# Patient Record
Sex: FEMALE | Race: BLACK OR AFRICAN AMERICAN | NOT HISPANIC OR LATINO | Employment: FULL TIME | URBAN - METROPOLITAN AREA
[De-identification: names, ages, dates, MRNs, and addresses within clinical notes are randomized per-mention and may not be internally consistent; named-entity substitution may affect disease eponyms.]

---

## 2018-01-09 ENCOUNTER — TELEPHONE (OUTPATIENT)
Dept: UROGYNECOLOGY | Facility: CLINIC | Age: 23
End: 2018-01-09

## 2018-01-09 NOTE — TELEPHONE ENCOUNTER
Left voice message for pt to give the office a call back at [phone] to reschedule appointment.

## 2018-01-10 ENCOUNTER — TELEPHONE (OUTPATIENT)
Dept: UROGYNECOLOGY | Facility: CLINIC | Age: 23
End: 2018-01-10

## 2020-04-15 ENCOUNTER — OFFICE VISIT (OUTPATIENT)
Dept: OBSTETRICS AND GYNECOLOGY | Facility: CLINIC | Age: 25
End: 2020-04-15
Payer: COMMERCIAL

## 2020-04-15 VITALS
BODY MASS INDEX: 29.76 KG/M2 | WEIGHT: 185.19 LBS | DIASTOLIC BLOOD PRESSURE: 70 MMHG | HEIGHT: 66 IN | SYSTOLIC BLOOD PRESSURE: 120 MMHG | TEMPERATURE: 99 F

## 2020-04-15 DIAGNOSIS — Z32.01 POSITIVE PREGNANCY TEST: ICD-10-CM

## 2020-04-15 DIAGNOSIS — N91.2 AMENORRHEA: Primary | ICD-10-CM

## 2020-04-15 LAB
B-HCG UR QL: POSITIVE
CTP QC/QA: YES

## 2020-04-15 PROCEDURE — 3008F PR BODY MASS INDEX (BMI) DOCUMENTED: ICD-10-PCS | Mod: CPTII,S$GLB,, | Performed by: OBSTETRICS & GYNECOLOGY

## 2020-04-15 PROCEDURE — 87086 URINE CULTURE/COLONY COUNT: CPT

## 2020-04-15 PROCEDURE — 3008F BODY MASS INDEX DOCD: CPT | Mod: CPTII,S$GLB,, | Performed by: OBSTETRICS & GYNECOLOGY

## 2020-04-15 PROCEDURE — 99202 PR OFFICE/OUTPT VISIT, NEW, LEVL II, 15-29 MIN: ICD-10-PCS | Mod: 25,S$GLB,, | Performed by: OBSTETRICS & GYNECOLOGY

## 2020-04-15 PROCEDURE — 99999 PR PBB SHADOW E&M-EST. PATIENT-LVL III: CPT | Mod: PBBFAC,,, | Performed by: OBSTETRICS & GYNECOLOGY

## 2020-04-15 PROCEDURE — 88175 CYTOPATH C/V AUTO FLUID REDO: CPT

## 2020-04-15 PROCEDURE — 81025 URINE PREGNANCY TEST: CPT | Mod: S$GLB,,, | Performed by: OBSTETRICS & GYNECOLOGY

## 2020-04-15 PROCEDURE — 99999 PR PBB SHADOW E&M-EST. PATIENT-LVL III: ICD-10-PCS | Mod: PBBFAC,,, | Performed by: OBSTETRICS & GYNECOLOGY

## 2020-04-15 PROCEDURE — 81025 POCT URINE PREGNANCY: ICD-10-PCS | Mod: S$GLB,,, | Performed by: OBSTETRICS & GYNECOLOGY

## 2020-04-15 PROCEDURE — 99202 OFFICE O/P NEW SF 15 MIN: CPT | Mod: 25,S$GLB,, | Performed by: OBSTETRICS & GYNECOLOGY

## 2020-04-15 PROCEDURE — 87491 CHLMYD TRACH DNA AMP PROBE: CPT

## 2020-04-15 NOTE — PROGRESS NOTES
SUBJECTIVE:   24 y.o. female   for annual routine Pap and checkup. No LMP recorded. Patient is pregnant..  She complains of amenorrhea.  +UPT.        History reviewed. No pertinent past medical history.  History reviewed. No pertinent surgical history.  Social History     Socioeconomic History    Marital status: Single     Spouse name: Not on file    Number of children: Not on file    Years of education: Not on file    Highest education level: Not on file   Occupational History    Not on file   Social Needs    Financial resource strain: Not on file    Food insecurity:     Worry: Not on file     Inability: Not on file    Transportation needs:     Medical: Not on file     Non-medical: Not on file   Tobacco Use    Smoking status: Former Smoker    Smokeless tobacco: Never Used   Substance and Sexual Activity    Alcohol use: Not Currently     Comment: occasional    Drug use: Never     Comment: occasional    Sexual activity: Yes     Partners: Male   Lifestyle    Physical activity:     Days per week: Not on file     Minutes per session: Not on file    Stress: Not on file   Relationships    Social connections:     Talks on phone: Not on file     Gets together: Not on file     Attends Christian service: Not on file     Active member of club or organization: Not on file     Attends meetings of clubs or organizations: Not on file     Relationship status: Not on file   Other Topics Concern    Not on file   Social History Narrative    Not on file     Family History   Problem Relation Age of Onset    Breast cancer Neg Hx     Colon cancer Neg Hx     Ovarian cancer Neg Hx      OB History    Para Term  AB Living   1 0 0 0 0 0   SAB TAB Ectopic Multiple Live Births   0 0 0 0 0      # Outcome Date GA Lbr Harry/2nd Weight Sex Delivery Anes PTL Lv   1 Current                  Current Outpatient Medications   Medication Sig Dispense Refill    prenatal vit,calc76/iron/folic (PNV 29-1 ORAL) Take 1  "tablet by mouth once daily.      multivit41-iron-folate8-ps-dha (ENBRACE HR) 1.5 mg iron- 8.73 mg-6.4 mg CpID Take 1 capsule by mouth once daily. 30 each 12     No current facility-administered medications for this visit.      Allergies: Patient has no known allergies.     ROS:  Constitutional: no weight loss, weight gain, fever, fatigue  Eyes:  No vision changes, glasses/contacts  ENT/Mouth: No ulcers, sinus problems, ears ringing, headache  Cardiovascular: No inability to lie flat, chest pain, exercise intolerance, swelling, heart palpitations  Respiratory: No wheezing, coughing blood, shortness of breath, or cough  Gastrointestinal: No diarrhea, bloody stool, nausea/vomiting, constipation, gas, hemorrhoids  Genitourinary: No blood in urine, painful urination, urgency of urination, frequency of urination, incomplete emptying, incontinence, abnormal bleeding, painful periods, heavy periods, vaginal discharge, vaginal odor, painful intercourse, sexual problems, bleeding after intercourse.  Musculoskeletal: No muscle weakness  Skin/Breast: No painful breasts, nipple discharge, masses, rash, ulcers  Neurological: No passing out, seizures, numbness, headache  Endocrine: No diabetes, hypothyroid, hyperthyroid, hot flashes, hair loss, abnormal hair growth, ance  Psychiatric: No depression, crying  Hematologic: No bruises, bleeding, swollen lymph nodes, anemia.      OBJECTIVE:   The patient appears well, alert, oriented x 3, in no distress.  /70   Temp 98.6 °F (37 °C)   Ht 5' 6" (1.676 m)   Wt 84 kg (185 lb 3 oz)   BMI 29.89 kg/m²   NECK: no thyromegaly, trachea midline  SKIN: no acne, striae, hirsutism  BREAST EXAM: breasts appear normal, no suspicious masses, no skin or nipple changes or axillary nodes  ABDOMEN: no hernias, masses, or hepatosplenomegaly  GENITALIA: normal external genitalia, no erythema, no discharge  URETHRA: normal urethra, normal urethral meatus  VAGINA: Normal  CERVIX: no lesions or " cervical motion tenderness  UTERUS: normal size, contour, position, consistency, mobility, non-tender  ADNEXA: normal adnexa and no mass, fullness, tenderness    \  ASSESSMENT:   Rocio was seen today for amenorrhea.    Diagnoses and all orders for this visit:    Amenorrhea  -     POCT urine pregnancy    Positive pregnancy test  -     Basic metabolic panel; Future  -     HIV 1/2 Ag/Ab (4th Gen); Future  -     RPR; Future  -     Hemoglobin Electrophoresis,Hgb A2 Evelio.; Future  -     Hepatitis B Surface Antigen; Future  -     Type & Screen - Ob Profile; Future  -     Rubella Antibody, IgG; Future  -     Urine culture  -     C. trachomatis/N. gonorrhoeae by AMP DNA  -     CBC auto differential; Future  -     US OB/GYN Procedure ([facility]); Future  -     multivit41-iron-folate8-ps-dha (ENBRACE HR) 1.5 mg iron- 8.73 mg-6.4 mg CpID; Take 1 capsule by mouth once daily.    Counseled to avoid cat litter, not garden without gloves, avoid raw meat, heat up deli meat, to eat large fish like tuna no more than once a week, and to avoid soft unpasteurized cheeses.  I recommend a PNV daily.  She should avoid ibuprofen.

## 2020-04-16 LAB
BACTERIA UR CULT: NORMAL
BACTERIA UR CULT: NORMAL
C TRACH DNA SPEC QL NAA+PROBE: NOT DETECTED
FINAL PATHOLOGIC DIAGNOSIS: NORMAL
Lab: NORMAL
N GONORRHOEA DNA SPEC QL NAA+PROBE: NOT DETECTED

## 2020-04-17 ENCOUNTER — PATIENT MESSAGE (OUTPATIENT)
Dept: OBSTETRICS AND GYNECOLOGY | Facility: CLINIC | Age: 25
End: 2020-04-17

## 2020-04-29 ENCOUNTER — LAB VISIT (OUTPATIENT)
Dept: LAB | Facility: OTHER | Age: 25
End: 2020-04-29
Attending: OBSTETRICS & GYNECOLOGY
Payer: COMMERCIAL

## 2020-04-29 ENCOUNTER — PROCEDURE VISIT (OUTPATIENT)
Dept: OBSTETRICS AND GYNECOLOGY | Facility: CLINIC | Age: 25
End: 2020-04-29
Attending: OBSTETRICS & GYNECOLOGY
Payer: COMMERCIAL

## 2020-04-29 DIAGNOSIS — Z32.01 POSITIVE PREGNANCY TEST: ICD-10-CM

## 2020-04-29 DIAGNOSIS — Z36.89 ESTABLISH GESTATIONAL AGE, ULTRASOUND: ICD-10-CM

## 2020-04-29 LAB
ABO + RH BLD: NORMAL
ANION GAP SERPL CALC-SCNC: 10 MMOL/L (ref 8–16)
BASOPHILS # BLD AUTO: 0.03 K/UL (ref 0–0.2)
BASOPHILS NFR BLD: 0.6 % (ref 0–1.9)
BLD GP AB SCN CELLS X3 SERPL QL: NORMAL
BUN SERPL-MCNC: 6 MG/DL (ref 6–20)
CALCIUM SERPL-MCNC: 9.6 MG/DL (ref 8.7–10.5)
CHLORIDE SERPL-SCNC: 102 MMOL/L (ref 95–110)
CO2 SERPL-SCNC: 23 MMOL/L (ref 23–29)
CREAT SERPL-MCNC: 0.6 MG/DL (ref 0.5–1.4)
DIFFERENTIAL METHOD: ABNORMAL
EOSINOPHIL # BLD AUTO: 0.1 K/UL (ref 0–0.5)
EOSINOPHIL NFR BLD: 1 % (ref 0–8)
ERYTHROCYTE [DISTWIDTH] IN BLOOD BY AUTOMATED COUNT: 11.8 % (ref 11.5–14.5)
EST. GFR  (AFRICAN AMERICAN): >60 ML/MIN/1.73 M^2
EST. GFR  (NON AFRICAN AMERICAN): >60 ML/MIN/1.73 M^2
GLUCOSE SERPL-MCNC: 104 MG/DL (ref 70–110)
HBV SURFACE AG SERPL QL IA: NEGATIVE
HCT VFR BLD AUTO: 34.9 % (ref 37–48.5)
HGB BLD-MCNC: 11.2 G/DL (ref 12–16)
HIV 1+2 AB+HIV1 P24 AG SERPL QL IA: NEGATIVE
IMM GRANULOCYTES # BLD AUTO: 0.02 K/UL (ref 0–0.04)
IMM GRANULOCYTES NFR BLD AUTO: 0.4 % (ref 0–0.5)
LYMPHOCYTES # BLD AUTO: 1.3 K/UL (ref 1–4.8)
LYMPHOCYTES NFR BLD: 26.9 % (ref 18–48)
MCH RBC QN AUTO: 27.8 PG (ref 27–31)
MCHC RBC AUTO-ENTMCNC: 32.1 G/DL (ref 32–36)
MCV RBC AUTO: 87 FL (ref 82–98)
MONOCYTES # BLD AUTO: 0.4 K/UL (ref 0.3–1)
MONOCYTES NFR BLD: 7.5 % (ref 4–15)
NEUTROPHILS # BLD AUTO: 3.1 K/UL (ref 1.8–7.7)
NEUTROPHILS NFR BLD: 63.6 % (ref 38–73)
NRBC BLD-RTO: 0 /100 WBC
PLATELET # BLD AUTO: 300 K/UL (ref 150–350)
PMV BLD AUTO: 9.3 FL (ref 9.2–12.9)
POTASSIUM SERPL-SCNC: 3.6 MMOL/L (ref 3.5–5.1)
RBC # BLD AUTO: 4.03 M/UL (ref 4–5.4)
RPR SER QL: NORMAL
SODIUM SERPL-SCNC: 135 MMOL/L (ref 136–145)
WBC # BLD AUTO: 4.83 K/UL (ref 3.9–12.7)

## 2020-04-29 PROCEDURE — 36415 COLL VENOUS BLD VENIPUNCTURE: CPT

## 2020-04-29 PROCEDURE — 80048 BASIC METABOLIC PNL TOTAL CA: CPT

## 2020-04-29 PROCEDURE — 83020 HEMOGLOBIN ELECTROPHORESIS: CPT

## 2020-04-29 PROCEDURE — 85025 COMPLETE CBC W/AUTO DIFF WBC: CPT

## 2020-04-29 PROCEDURE — 87340 HEPATITIS B SURFACE AG IA: CPT

## 2020-04-29 PROCEDURE — 76801 PR US, OB <14WKS, TRANSABD, SINGLE GESTATION: ICD-10-PCS | Mod: S$GLB,,, | Performed by: OBSTETRICS & GYNECOLOGY

## 2020-04-29 PROCEDURE — 86592 SYPHILIS TEST NON-TREP QUAL: CPT

## 2020-04-29 PROCEDURE — 76801 OB US < 14 WKS SINGLE FETUS: CPT | Mod: S$GLB,,, | Performed by: OBSTETRICS & GYNECOLOGY

## 2020-04-29 PROCEDURE — 86762 RUBELLA ANTIBODY: CPT

## 2020-04-29 PROCEDURE — 86703 HIV-1/HIV-2 1 RESULT ANTBDY: CPT

## 2020-04-29 PROCEDURE — 86901 BLOOD TYPING SEROLOGIC RH(D): CPT

## 2020-04-30 LAB
HGB A2 MFR BLD HPLC: 3.2 % (ref 2.2–3.2)
HGB FRACT BLD ELPH-IMP: NORMAL
HGB FRACT BLD ELPH-IMP: NORMAL
RUBV IGG SER-ACNC: 40.2 IU/ML
RUBV IGG SER-IMP: REACTIVE

## 2020-05-04 ENCOUNTER — PATIENT MESSAGE (OUTPATIENT)
Dept: OBSTETRICS AND GYNECOLOGY | Facility: CLINIC | Age: 25
End: 2020-05-04

## 2020-05-06 ENCOUNTER — PATIENT MESSAGE (OUTPATIENT)
Dept: OBSTETRICS AND GYNECOLOGY | Facility: CLINIC | Age: 25
End: 2020-05-06

## 2020-05-12 ENCOUNTER — TELEPHONE (OUTPATIENT)
Dept: OBSTETRICS AND GYNECOLOGY | Facility: CLINIC | Age: 25
End: 2020-05-12

## 2020-05-12 ENCOUNTER — OFFICE VISIT (OUTPATIENT)
Dept: OBSTETRICS AND GYNECOLOGY | Facility: CLINIC | Age: 25
End: 2020-05-12
Payer: COMMERCIAL

## 2020-05-12 VITALS — BODY MASS INDEX: 29.86 KG/M2 | WEIGHT: 185 LBS

## 2020-05-12 DIAGNOSIS — Z34.01 ENCOUNTER FOR SUPERVISION OF NORMAL FIRST PREGNANCY IN FIRST TRIMESTER: ICD-10-CM

## 2020-05-12 DIAGNOSIS — Z34.01 ENCOUNTER FOR SUPERVISION OF NORMAL FIRST PREGNANCY IN FIRST TRIMESTER: Primary | ICD-10-CM

## 2020-05-12 PROBLEM — Z34.00 PREGNANCY, SUPERVISION, NORMAL, FIRST: Status: ACTIVE | Noted: 2020-05-12

## 2020-05-12 PROCEDURE — 0502F SUBSEQUENT PRENATAL CARE: CPT | Mod: ,,, | Performed by: OBSTETRICS & GYNECOLOGY

## 2020-05-12 PROCEDURE — 0502F PR SUBSEQUENT PRENATAL CARE: ICD-10-PCS | Mod: ,,, | Performed by: OBSTETRICS & GYNECOLOGY

## 2020-05-12 PROCEDURE — 3008F BODY MASS INDEX DOCD: CPT | Mod: CPTII,,, | Performed by: OBSTETRICS & GYNECOLOGY

## 2020-05-12 PROCEDURE — 3008F PR BODY MASS INDEX (BMI) DOCUMENTED: ICD-10-PCS | Mod: CPTII,,, | Performed by: OBSTETRICS & GYNECOLOGY

## 2020-05-12 NOTE — PROGRESS NOTES
The patient location is: work  The chief complaint leading to consultation is: pregnancy  Visit type: audiovisual  Total time spent with patient: 15  Each patient to whom he or she provides medical services by telemedicine is:  (1) informed of the relationship between the physician and patient and the respective role of any other health care provider with respect to management of the patient; and (2) notified that he or she may decline to receive medical services by telemedicine and may withdraw from such care at any time.    Notes:   Complaints today:none    Wt 83.9 kg (185 lb)   BMI 29.86 kg/m²     24 y.o., at 12w3d by Estimated Date of Delivery: 20  Patient Active Problem List   Diagnosis    Pregnancy, supervision, normal, first     OB History    Para Term  AB Living   1 0 0 0 0 0   SAB TAB Ectopic Multiple Live Births   0 0 0 0 0      # Outcome Date GA Lbr Harry/2nd Weight Sex Delivery Anes PTL Lv   1 Current                Dating reviewed    Allergies and problem list reviewed and updated    Medical and surgical history reviewed    Prenatal labs reviewed and updated    Physical Exam:  Gen: nad  Neuro: a&o x 3  Psych: normal affect  Musc: ambulating without difficulty    Assessment:  Diagnoses and all orders for this visit:    Encounter for supervision of normal first pregnancy in first trimester  -     Maternal Integrated Screen Part 1; Future    Other orders  -     Connected MOM Enrollment  -     Assign Connected MOM Program Consent Questionnaire     counseled, desires genetics    Plan:   Anatomy ordered.  Int 2 on rtc   follow up 4 Weeks, bleeding/pain

## 2020-05-12 NOTE — TELEPHONE ENCOUNTER
----- Message from Norman Cruz sent at 5/12/2020  9:24 AM CDT -----  Please call pt she has a virtual visit  On ur  She needs to reschedule it for tomorrow  [phone]

## 2020-05-12 NOTE — TELEPHONE ENCOUNTER
----- Message from Trev Ryan sent at 5/12/2020  3:05 PM CDT -----  Contact: LILLIAN NIELSON [[id number]]   Name of Who is Calling:     What is the request in detail: patient request call back in reference to scheduled appointments questions/concerns  Please contact to further discuss and advise      Can the clinic reply by [facility]: no     What Number to Call Back if not in [facility]:  [phone]

## 2020-05-13 ENCOUNTER — LAB VISIT (OUTPATIENT)
Dept: LAB | Facility: OTHER | Age: 25
End: 2020-05-13
Attending: OBSTETRICS & GYNECOLOGY
Payer: COMMERCIAL

## 2020-05-13 DIAGNOSIS — Z34.01 ENCOUNTER FOR SUPERVISION OF NORMAL FIRST PREGNANCY IN FIRST TRIMESTER: ICD-10-CM

## 2020-05-13 PROCEDURE — 84163 PAPPA SERUM: CPT

## 2020-05-13 PROCEDURE — 36415 COLL VENOUS BLD VENIPUNCTURE: CPT

## 2020-05-14 ENCOUNTER — PATIENT MESSAGE (OUTPATIENT)
Dept: ADMINISTRATIVE | Facility: OTHER | Age: 25
End: 2020-05-14

## 2020-05-18 ENCOUNTER — PATIENT MESSAGE (OUTPATIENT)
Dept: OBSTETRICS AND GYNECOLOGY | Facility: CLINIC | Age: 25
End: 2020-05-18

## 2020-05-18 LAB — INTEGRATED SCN PATIENT-IMP NAR: NORMAL

## 2020-05-19 ENCOUNTER — PATIENT MESSAGE (OUTPATIENT)
Dept: OBSTETRICS AND GYNECOLOGY | Facility: CLINIC | Age: 25
End: 2020-05-19

## 2020-06-10 ENCOUNTER — ROUTINE PRENATAL (OUTPATIENT)
Dept: OBSTETRICS AND GYNECOLOGY | Facility: CLINIC | Age: 25
End: 2020-06-10
Payer: COMMERCIAL

## 2020-06-10 VITALS
WEIGHT: 175.06 LBS | BODY MASS INDEX: 28.25 KG/M2 | DIASTOLIC BLOOD PRESSURE: 70 MMHG | SYSTOLIC BLOOD PRESSURE: 120 MMHG

## 2020-06-10 DIAGNOSIS — Z34.02 ENCOUNTER FOR SUPERVISION OF NORMAL FIRST PREGNANCY IN SECOND TRIMESTER: Primary | ICD-10-CM

## 2020-06-10 PROCEDURE — 3008F BODY MASS INDEX DOCD: CPT | Mod: CPTII,S$GLB,, | Performed by: OBSTETRICS & GYNECOLOGY

## 2020-06-10 PROCEDURE — 99999 PR PBB SHADOW E&M-EST. PATIENT-LVL III: ICD-10-PCS | Mod: PBBFAC,,, | Performed by: OBSTETRICS & GYNECOLOGY

## 2020-06-10 PROCEDURE — 0502F SUBSEQUENT PRENATAL CARE: CPT | Mod: S$GLB,,, | Performed by: OBSTETRICS & GYNECOLOGY

## 2020-06-10 PROCEDURE — 3008F PR BODY MASS INDEX (BMI) DOCUMENTED: ICD-10-PCS | Mod: CPTII,S$GLB,, | Performed by: OBSTETRICS & GYNECOLOGY

## 2020-06-10 PROCEDURE — 99999 PR PBB SHADOW E&M-EST. PATIENT-LVL III: CPT | Mod: PBBFAC,,, | Performed by: OBSTETRICS & GYNECOLOGY

## 2020-06-10 PROCEDURE — 0502F PR SUBSEQUENT PRENATAL CARE: ICD-10-PCS | Mod: S$GLB,,, | Performed by: OBSTETRICS & GYNECOLOGY

## 2020-06-10 NOTE — PROGRESS NOTES
Complaints today: none, No Bleeding or pains    /70   Wt 79.4 kg (175 lb 0.7 oz)   BMI 28.25 kg/m²     24 y.o., at 16w4d by Estimated Date of Delivery: 20  Patient Active Problem List   Diagnosis    Pregnancy, supervision, normal, first     OB History    Para Term  AB Living   1 0 0 0 0 0   SAB TAB Ectopic Multiple Live Births   0 0 0 0 0      # Outcome Date GA Lbr Harry/2nd Weight Sex Delivery Anes PTL Lv   1 Current                Dating reviewed    Allergies and problem list reviewed and updated    Medical and surgical history reviewed    Prenatal labs reviewed and updated    Physical Exam:  ABD: soft, gravid, nontender,     Assessment:  Franci was seen today for routine prenatal visit.    Diagnoses and all orders for this visit:    Encounter for supervision of normal first pregnancy in second trimester  -     Maternal Integrated Screen Part 2; Future         Plan:   follow up labs.  Anatomy scheduled   follow up 4 Weeks, bleeding/pain precautions

## 2020-06-15 LAB
# FETUSES US: NORMAL
AFP MOM SERPL: 0.98
AFP SERPL-MCNC: 33.2 NG/ML
AGE AT DELIVERY: 23
B-HCG MOM SERPL: 0.49
B-HCG SERPL-ACNC: 15.4 IU/ML
COLLECT DATE BLD: NORMAL
COLLECT DATE: NORMAL
FET TS 21 RISK FROM MAT AGE: NORMAL
GA (DAYS): 4 D
GA (WEEKS): 12 WK
GA METHOD: NORMAL
GEST. AGE (DAYS) 2ND SAMPLE (SI2): 4
GEST. AGE (WKS) 2ND SAMPLE (SI2): 16
IDDM PATIENT QL: NORMAL
INHIBIN A MOM SERPL: 0.69
INHIBIN A SERPL-MCNC: 98.4 PG/ML
INTEGRATED SCN PATIENT-IMP NAR: NORMAL
INTEGRATED SCN PATIENT-IMP: NEGATIVE
PAPP-A MOM SERPL: 2.56
PAPP-A SERPL-MCNC: NORMAL NG/ML
SMOKING STATUS FTND: NO
TS 18 RISK FETUS: NORMAL
TS 21 RISK FETUS: NORMAL
U ESTRIOL MOM SERPL: 1.06
U ESTRIOL SERPL-MCNC: 1.09 NG/ML

## 2020-07-15 ENCOUNTER — PROCEDURE VISIT (OUTPATIENT)
Dept: MATERNAL FETAL MEDICINE | Facility: CLINIC | Age: 25
End: 2020-07-15
Attending: OBSTETRICS & GYNECOLOGY
Payer: COMMERCIAL

## 2020-07-15 DIAGNOSIS — Z36.4 ANTENATAL SCREENING FOR FETAL GROWTH RETARDATION USING ULTRASONICS: ICD-10-CM

## 2020-07-15 DIAGNOSIS — Z34.01 ENCOUNTER FOR SUPERVISION OF NORMAL FIRST PREGNANCY IN FIRST TRIMESTER: ICD-10-CM

## 2020-07-15 DIAGNOSIS — Z3A.21 21 WEEKS GESTATION OF PREGNANCY: ICD-10-CM

## 2020-07-15 DIAGNOSIS — Z36.3 ANTENATAL SCREENING FOR MALFORMATION USING ULTRASONICS: ICD-10-CM

## 2020-07-15 PROCEDURE — 76805 PR US, OB 14+WKS, TRANSABD, SINGLE GESTATION: ICD-10-PCS | Mod: S$GLB,,, | Performed by: OBSTETRICS & GYNECOLOGY

## 2020-07-15 PROCEDURE — 76805 OB US >/= 14 WKS SNGL FETUS: CPT | Mod: S$GLB,,, | Performed by: OBSTETRICS & GYNECOLOGY

## 2020-07-22 ENCOUNTER — ROUTINE PRENATAL (OUTPATIENT)
Dept: OBSTETRICS AND GYNECOLOGY | Facility: CLINIC | Age: 25
End: 2020-07-22
Payer: COMMERCIAL

## 2020-07-22 VITALS
DIASTOLIC BLOOD PRESSURE: 62 MMHG | WEIGHT: 174.63 LBS | SYSTOLIC BLOOD PRESSURE: 100 MMHG | BODY MASS INDEX: 28.18 KG/M2

## 2020-07-22 DIAGNOSIS — Z34.02 ENCOUNTER FOR SUPERVISION OF NORMAL FIRST PREGNANCY IN SECOND TRIMESTER: Primary | ICD-10-CM

## 2020-07-22 DIAGNOSIS — N76.0 ACUTE VAGINITIS: ICD-10-CM

## 2020-07-22 PROCEDURE — 3008F BODY MASS INDEX DOCD: CPT | Mod: CPTII,S$GLB,, | Performed by: OBSTETRICS & GYNECOLOGY

## 2020-07-22 PROCEDURE — 87510 GARDNER VAG DNA DIR PROBE: CPT

## 2020-07-22 PROCEDURE — 0502F PR SUBSEQUENT PRENATAL CARE: ICD-10-PCS | Mod: S$GLB,,, | Performed by: OBSTETRICS & GYNECOLOGY

## 2020-07-22 PROCEDURE — 3008F PR BODY MASS INDEX (BMI) DOCUMENTED: ICD-10-PCS | Mod: CPTII,S$GLB,, | Performed by: OBSTETRICS & GYNECOLOGY

## 2020-07-22 PROCEDURE — 99999 PR PBB SHADOW E&M-EST. PATIENT-LVL III: ICD-10-PCS | Mod: PBBFAC,,, | Performed by: OBSTETRICS & GYNECOLOGY

## 2020-07-22 PROCEDURE — 99999 PR PBB SHADOW E&M-EST. PATIENT-LVL III: CPT | Mod: PBBFAC,,, | Performed by: OBSTETRICS & GYNECOLOGY

## 2020-07-22 PROCEDURE — 0502F SUBSEQUENT PRENATAL CARE: CPT | Mod: S$GLB,,, | Performed by: OBSTETRICS & GYNECOLOGY

## 2020-07-22 PROCEDURE — 87480 CANDIDA DNA DIR PROBE: CPT

## 2020-07-22 NOTE — LETTER
July 22, 2020    Franci Brown  [address]  [place] [address]         [facility]  [address]  [place] [address]  Phone: [phone] July 22, 2020     Patient: Franci Brown   YOB: 1995   Date of Visit: 7/22/2020       To Whom It May Concern:    It is my medical opinion that Franci Brown may return to light duty immediately with the following restrictions: no lifting greater than 20 pounds and needs to be able to sit down when necessary .    If you have any questions or concerns, please don't hesitate to call.    Sincerely,        Margaret Watts MD

## 2020-07-22 NOTE — PROGRESS NOTES
Complaints today: discharge with odor, Good fetal movements reported.  No Bleeding or pains    /62   Wt 79.2 kg (174 lb 9.7 oz)   BMI 28.18 kg/m²     24 y.o., at 22w4d by Estimated Date of Delivery: 20  Patient Active Problem List   Diagnosis    Pregnancy, supervision, normal, first     OB History    Para Term  AB Living   1 0 0 0 0 0   SAB TAB Ectopic Multiple Live Births   0 0 0 0 0      # Outcome Date GA Lbr Harry/2nd Weight Sex Delivery Anes PTL Lv   1 Current                Dating reviewed    Allergies and problem list reviewed and updated    Medical and surgical history reviewed    Prenatal labs reviewed and updated    Physical Exam:  ABD: soft, gravid, nontender,     Assessment:  Franci was seen today for routine prenatal visit.    Diagnoses and all orders for this visit:    Encounter for supervision of normal first pregnancy in second trimester    Acute vaginitis  -     Vaginosis Screen by DNA Probe    Other orders  -     Cancel: US MFM Procedure ([facility]); Future          Plan:   follow up ultrasound scheduled.  Glucola and cbc on rtc.    follow up 4 Weeks, bleeding/pain precautions

## 2020-07-29 LAB
CANDIDA RRNA VAG QL PROBE: NORMAL
G VAGINALIS RRNA GENITAL QL PROBE: NORMAL
T VAGINALIS RRNA GENITAL QL PROBE: NORMAL

## 2020-07-30 ENCOUNTER — TELEPHONE (OUTPATIENT)
Dept: OBSTETRICS AND GYNECOLOGY | Facility: HOSPITAL | Age: 25
End: 2020-07-30

## 2020-07-30 NOTE — TELEPHONE ENCOUNTER
----- Message from Matt Fox MA sent at 7/30/2020  2:55 PM CDT -----  Regarding: FW: Lab test cancellation    ----- Message -----  From: Ayala Hernandez  Sent: 7/30/2020  12:00 PM CDT  To: [facility] Staff  Subject: Lab test cancellation                            There was an issue with the Vaginosis Screen, [id number] ordered on this patient from 7/23/2020.    Unfortunately, there was an error in handling the specimen at the reference lab and the stability limit for testing was exceeded. The order has been cancelled and will need to be reordered and recollected if clinically indicated.    Please call the Sendout lab at [phone] ext. [phone] if there are any questions.  Anyone in the Sendout lab will be able to assist you.

## 2020-07-31 ENCOUNTER — ROUTINE PRENATAL (OUTPATIENT)
Dept: OBSTETRICS AND GYNECOLOGY | Facility: CLINIC | Age: 25
End: 2020-07-31
Payer: COMMERCIAL

## 2020-07-31 VITALS — WEIGHT: 175.94 LBS | SYSTOLIC BLOOD PRESSURE: 120 MMHG | DIASTOLIC BLOOD PRESSURE: 80 MMHG | BODY MASS INDEX: 28.4 KG/M2

## 2020-07-31 DIAGNOSIS — N89.8 VAGINAL DISCHARGE: ICD-10-CM

## 2020-07-31 DIAGNOSIS — Z34.02 ENCOUNTER FOR SUPERVISION OF NORMAL FIRST PREGNANCY IN SECOND TRIMESTER: Primary | ICD-10-CM

## 2020-07-31 PROCEDURE — 3008F PR BODY MASS INDEX (BMI) DOCUMENTED: ICD-10-PCS | Mod: CPTII,S$GLB,, | Performed by: OBSTETRICS & GYNECOLOGY

## 2020-07-31 PROCEDURE — 3008F BODY MASS INDEX DOCD: CPT | Mod: CPTII,S$GLB,, | Performed by: OBSTETRICS & GYNECOLOGY

## 2020-07-31 PROCEDURE — 0502F SUBSEQUENT PRENATAL CARE: CPT | Mod: CPTII,S$GLB,, | Performed by: OBSTETRICS & GYNECOLOGY

## 2020-07-31 PROCEDURE — 99999 PR PBB SHADOW E&M-EST. PATIENT-LVL II: CPT | Mod: PBBFAC,,, | Performed by: OBSTETRICS & GYNECOLOGY

## 2020-07-31 PROCEDURE — 0502F PR SUBSEQUENT PRENATAL CARE: ICD-10-PCS | Mod: CPTII,S$GLB,, | Performed by: OBSTETRICS & GYNECOLOGY

## 2020-07-31 PROCEDURE — 99999 PR PBB SHADOW E&M-EST. PATIENT-LVL II: ICD-10-PCS | Mod: PBBFAC,,, | Performed by: OBSTETRICS & GYNECOLOGY

## 2020-07-31 NOTE — PROGRESS NOTES
Reason for visit: Routine Prenatal Visit    HPI:   24 y.o., at 23w6d by Estimated Date of Delivery: 20    Seen on  with c/o discharge and odor.  Affirm was voided due to lab error.  Returns today for possible repeat testing.  She continues to note some discharge, but it is not the same as previous.  She denies odor or irritation.    ROS:  OBSTETRICS  - Contractions: N  - Bleeding: N  - Loss of fluid: N  - Fetal movement: Y  GASTRO  - Nausea: N  - Vomiting: N  NEURO  - Headache: N      Past medical, surgical, social, and family, and history: Reviewed and updated in EMR.  Medications: Reviewed and updated in EMR.  Allergies: Patient has no known allergies.     OB History    Para Term  AB Living   1 0 0 0 0 0   SAB TAB Ectopic Multiple Live Births   0 0 0 0 0      # Outcome Date GA Lbr Harry/2nd Weight Sex Delivery Anes PTL Lv   1 Current                Pregnancy dating, labs, ultrasound reports, prenatal testing, and problem list: Reviewed and updated in EMR.    preg Problems (from 20 to present)     Problem Noted Resolved    Pregnancy, supervision, normal, first 2020 by Margaret Watts MD No    Priority:  1 - High              Vitals: /80   Wt 79.8 kg (175 lb 14.8 oz)   BMI 28.40 kg/m²     Physical exam:  GENERAL: No acute distress, normal appearance  NECK: Supple, normal ROM  SKIN: No rashes  NEURO: Alert and oriented x3  PSYCH: Normal mood and affect  CARDIO: Trace bilateral LE edema  PULMONARY: Normal respiratory effort; no respiratory distress  ABD: Soft, gravid, nontender; no hernia or hepatosplenomegaly  EXT GENITALIA: Normal  URETHRA: Normal, no tenderness  URETHRAL MEATUS: Normal  BLADDER: Normal  VAGINA: Normal, physiologic discharge  CERVIX: Normal  UTERUS: Enlarged (gravid), nontender  ADNEXA: Unable to assess    Assessment and Plan:    Encounter for supervision of normal first pregnancy in second trimester    Vaginal discharge         23 weeks / Normal  pregnancy  o No cramps or bleeding  o Labs up to date.  Glucose screen on return to clinic  o Repeat u/s to complete anatomy scheduled.   Vaginal discharge  o Pt desires repeat testing with wet prep  o Physiologic appearing discharge on exam  o Wet prep negative for clue cells, yeast, or trich  o No further evaluation or treatment indicated at this time     labor precautions given  Follow-up: As scheduled

## 2020-08-19 ENCOUNTER — ROUTINE PRENATAL (OUTPATIENT)
Dept: OBSTETRICS AND GYNECOLOGY | Facility: CLINIC | Age: 25
End: 2020-08-19
Payer: COMMERCIAL

## 2020-08-19 VITALS
SYSTOLIC BLOOD PRESSURE: 116 MMHG | WEIGHT: 176.38 LBS | DIASTOLIC BLOOD PRESSURE: 60 MMHG | BODY MASS INDEX: 28.47 KG/M2

## 2020-08-19 DIAGNOSIS — Z34.02 ENCOUNTER FOR SUPERVISION OF NORMAL FIRST PREGNANCY IN SECOND TRIMESTER: Primary | ICD-10-CM

## 2020-08-19 DIAGNOSIS — Z30.017 ENCOUNTER FOR INITIAL PRESCRIPTION OF NEXPLANON: ICD-10-CM

## 2020-08-19 LAB
ERYTHROCYTE [DISTWIDTH] IN BLOOD BY AUTOMATED COUNT: 12.8 % (ref 11.5–14.5)
GLUCOSE SERPL-MCNC: 109 MG/DL (ref 70–140)
HCT VFR BLD AUTO: 32.9 % (ref 37–48.5)
HGB BLD-MCNC: 10.6 G/DL (ref 12–16)
MCH RBC QN AUTO: 29.7 PG (ref 27–31)
MCHC RBC AUTO-ENTMCNC: 32.2 G/DL (ref 32–36)
MCV RBC AUTO: 92 FL (ref 82–98)
PLATELET # BLD AUTO: 318 K/UL (ref 150–350)
PMV BLD AUTO: 9.7 FL (ref 9.2–12.9)
RBC # BLD AUTO: 3.57 M/UL (ref 4–5.4)
WBC # BLD AUTO: 5.03 K/UL (ref 3.9–12.7)

## 2020-08-19 PROCEDURE — 0502F SUBSEQUENT PRENATAL CARE: CPT | Mod: CPTII,S$GLB,, | Performed by: OBSTETRICS & GYNECOLOGY

## 2020-08-19 PROCEDURE — 99999 PR PBB SHADOW E&M-EST. PATIENT-LVL III: CPT | Mod: PBBFAC,,, | Performed by: OBSTETRICS & GYNECOLOGY

## 2020-08-19 PROCEDURE — 99999 PR PBB SHADOW E&M-EST. PATIENT-LVL III: ICD-10-PCS | Mod: PBBFAC,,, | Performed by: OBSTETRICS & GYNECOLOGY

## 2020-08-19 PROCEDURE — 85027 COMPLETE CBC AUTOMATED: CPT

## 2020-08-19 PROCEDURE — 0502F PR SUBSEQUENT PRENATAL CARE: ICD-10-PCS | Mod: CPTII,S$GLB,, | Performed by: OBSTETRICS & GYNECOLOGY

## 2020-08-19 PROCEDURE — 82950 GLUCOSE TEST: CPT

## 2020-08-19 NOTE — PROGRESS NOTES
Good fm.  Shayne ctx, vb, lof.    Franci was seen today for routine prenatal visit.    Diagnoses and all orders for this visit:    Encounter for supervision of normal first pregnancy in second trimester  -     CBC Without Differential  -     OB Glucose Screen    Care and examination of lactating mother  -     BREAST PUMP FOR HOME USE    Encounter for initial prescription of Nexplanon  -     Device Authorization Order

## 2020-08-27 ENCOUNTER — TELEPHONE (OUTPATIENT)
Dept: MATERNAL FETAL MEDICINE | Facility: CLINIC | Age: 25
End: 2020-08-27

## 2020-09-03 ENCOUNTER — ROUTINE PRENATAL (OUTPATIENT)
Dept: OBSTETRICS AND GYNECOLOGY | Facility: CLINIC | Age: 25
End: 2020-09-03
Payer: COMMERCIAL

## 2020-09-03 VITALS
WEIGHT: 169.31 LBS | DIASTOLIC BLOOD PRESSURE: 70 MMHG | BODY MASS INDEX: 27.33 KG/M2 | SYSTOLIC BLOOD PRESSURE: 110 MMHG

## 2020-09-03 DIAGNOSIS — Z34.03 ENCOUNTER FOR SUPERVISION OF NORMAL FIRST PREGNANCY IN THIRD TRIMESTER: Primary | ICD-10-CM

## 2020-09-03 DIAGNOSIS — O22.10: ICD-10-CM

## 2020-09-03 PROCEDURE — 90715 TDAP VACCINE GREATER THAN OR EQUAL TO 7YO IM: ICD-10-PCS | Mod: S$GLB,,, | Performed by: OBSTETRICS & GYNECOLOGY

## 2020-09-03 PROCEDURE — 90715 TDAP VACCINE 7 YRS/> IM: CPT | Mod: S$GLB,,, | Performed by: OBSTETRICS & GYNECOLOGY

## 2020-09-03 PROCEDURE — 99999 PR PBB SHADOW E&M-EST. PATIENT-LVL III: ICD-10-PCS | Mod: PBBFAC,,, | Performed by: OBSTETRICS & GYNECOLOGY

## 2020-09-03 PROCEDURE — 0502F SUBSEQUENT PRENATAL CARE: CPT | Mod: CPTII,S$GLB,, | Performed by: OBSTETRICS & GYNECOLOGY

## 2020-09-03 PROCEDURE — 90471 IMMUNIZATION ADMIN: CPT | Mod: S$GLB,,, | Performed by: OBSTETRICS & GYNECOLOGY

## 2020-09-03 PROCEDURE — 0502F PR SUBSEQUENT PRENATAL CARE: ICD-10-PCS | Mod: CPTII,S$GLB,, | Performed by: OBSTETRICS & GYNECOLOGY

## 2020-09-03 PROCEDURE — 99999 PR PBB SHADOW E&M-EST. PATIENT-LVL III: CPT | Mod: PBBFAC,,, | Performed by: OBSTETRICS & GYNECOLOGY

## 2020-09-03 PROCEDURE — 90471 TDAP VACCINE GREATER THAN OR EQUAL TO 7YO IM: ICD-10-PCS | Mod: S$GLB,,, | Performed by: OBSTETRICS & GYNECOLOGY

## 2020-09-03 RX ORDER — ERYTHROMYCIN 5 MG/G
OINTMENT OPHTHALMIC
COMMUNITY
Start: 2020-08-28

## 2020-09-03 NOTE — PROGRESS NOTES
Franci Brown is a 25 y.o. [id number] at 28w5d presents for routine prenatal visit. Patient denies contractions, denies vaginal bleeding, denies LOF.   Fetal Movement: normal.   She complains of lower pelvic pain with walking.       /70   Wt 76.8 kg (169 lb 5 oz)   BMI 27.33 kg/m²     25 y.o., at 28w5d by Estimated Date of Delivery: 20  Patient Active Problem List   Diagnosis    Pregnancy, supervision, normal, first/breast/pump/fmla/nexplanon     OB History    Para Term  AB Living   1 0 0 0 0 0   SAB TAB Ectopic Multiple Live Births   0 0 0 0 0      # Outcome Date GA Lbr Harry/2nd Weight Sex Delivery Anes PTL Lv   1 Current                Dating reviewed    Allergies and problem list reviewed and updated    Medical and surgical history reviewed    Prenatal labs reviewed and updated    Physical Exam:  ABD: soft, gravid, nontender  FH: 30 cm  FHT: 155 bpm    Franci was seen today for routine prenatal visit and vaginal pain.    Diagnoses and all orders for this visit:    Encounter for supervision of normal first pregnancy in third trimester      Plan:  - TDAP today.   - Recommend wearing maternity belt    Follow up 2 Weeks, kick counts, labor precautions     Kelsey Haines M.D.  OBGYN PGY-2

## 2020-09-03 NOTE — PROGRESS NOTES
MEDICATION DOCUMENTATION    Date: 9/3/2020          Time:   4:36 pm      DEPARTMENT: OB/GYN    ORDERING PHYSICIAN: Dr. Watts    Order Type: Written order    MEDICATION: TDAP      SITE/ROUTE:   Right Deltoid    LOT#: [id number]    : [employer]  Expiration Date: 04/12/2022    Requested patient to remain 15 minutes.    PRE PAIN SCALE: None    POST PAIN SCALE: None

## 2020-09-16 ENCOUNTER — PROCEDURE VISIT (OUTPATIENT)
Dept: MATERNAL FETAL MEDICINE | Facility: CLINIC | Age: 25
End: 2020-09-16
Attending: OBSTETRICS & GYNECOLOGY
Payer: COMMERCIAL

## 2020-09-16 ENCOUNTER — ROUTINE PRENATAL (OUTPATIENT)
Dept: OBSTETRICS AND GYNECOLOGY | Facility: CLINIC | Age: 25
End: 2020-09-16
Payer: COMMERCIAL

## 2020-09-16 VITALS — SYSTOLIC BLOOD PRESSURE: 110 MMHG | DIASTOLIC BLOOD PRESSURE: 78 MMHG | WEIGHT: 171.5 LBS | BODY MASS INDEX: 27.68 KG/M2

## 2020-09-16 DIAGNOSIS — Z23 NEEDS FLU SHOT: ICD-10-CM

## 2020-09-16 DIAGNOSIS — Z34.03 ENCOUNTER FOR SUPERVISION OF NORMAL FIRST PREGNANCY IN THIRD TRIMESTER: Primary | ICD-10-CM

## 2020-09-16 PROCEDURE — 3008F PR BODY MASS INDEX (BMI) DOCUMENTED: ICD-10-PCS | Mod: CPTII,S$GLB,, | Performed by: OBSTETRICS & GYNECOLOGY

## 2020-09-16 PROCEDURE — 76816 OB US FOLLOW-UP PER FETUS: CPT | Mod: S$GLB,,, | Performed by: OBSTETRICS & GYNECOLOGY

## 2020-09-16 PROCEDURE — 99999 PR PBB SHADOW E&M-EST. PATIENT-LVL III: ICD-10-PCS | Mod: PBBFAC,,, | Performed by: OBSTETRICS & GYNECOLOGY

## 2020-09-16 PROCEDURE — 90686 IIV4 VACC NO PRSV 0.5 ML IM: CPT | Mod: S$GLB,,, | Performed by: OBSTETRICS & GYNECOLOGY

## 2020-09-16 PROCEDURE — 76816 PR  US,PREGNANT UTERUS,F/U,TRANSABD APP: ICD-10-PCS | Mod: S$GLB,,, | Performed by: OBSTETRICS & GYNECOLOGY

## 2020-09-16 PROCEDURE — 90686 FLU VACCINE (QUAD) GREATER THAN OR EQUAL TO 3YO PRESERVATIVE FREE IM: ICD-10-PCS | Mod: S$GLB,,, | Performed by: OBSTETRICS & GYNECOLOGY

## 2020-09-16 PROCEDURE — 99999 PR PBB SHADOW E&M-EST. PATIENT-LVL III: CPT | Mod: PBBFAC,,, | Performed by: OBSTETRICS & GYNECOLOGY

## 2020-09-16 PROCEDURE — 3008F BODY MASS INDEX DOCD: CPT | Mod: CPTII,S$GLB,, | Performed by: OBSTETRICS & GYNECOLOGY

## 2020-09-16 PROCEDURE — 0502F SUBSEQUENT PRENATAL CARE: CPT | Mod: S$GLB,,, | Performed by: OBSTETRICS & GYNECOLOGY

## 2020-09-16 PROCEDURE — 90471 FLU VACCINE (QUAD) GREATER THAN OR EQUAL TO 3YO PRESERVATIVE FREE IM: ICD-10-PCS | Mod: S$GLB,,, | Performed by: OBSTETRICS & GYNECOLOGY

## 2020-09-16 PROCEDURE — 90471 IMMUNIZATION ADMIN: CPT | Mod: S$GLB,,, | Performed by: OBSTETRICS & GYNECOLOGY

## 2020-09-16 PROCEDURE — 0502F PR SUBSEQUENT PRENATAL CARE: ICD-10-PCS | Mod: S$GLB,,, | Performed by: OBSTETRICS & GYNECOLOGY

## 2020-09-16 NOTE — PROGRESS NOTES
MEDICATION DOCUMENTATION    Date: 9/16/2020          Time: 10:38 am        DEPARTMENT: OB/GYN    ORDERING PHYSICIAN: Dr. Watts    Order Type: Written order    MEDICATION: Fluarix       SITE/ROUTE:  Right Deltoid    LOT#:     : [id number]  Expiration Date:06/30/2021    Requested patient to remain 15 minutes.    PRE PAIN SCALE: None    POST PAIN SCALE: None

## 2020-09-16 NOTE — PROGRESS NOTES
Obstetrical ultrasound performed today. Please see imaging tab in EPIC for full ultrasound report and recommendations.

## 2020-09-16 NOTE — PROGRESS NOTES
Complaints today: none, Good fetal movements reported.  No Bleeding or pains    /78   Wt 77.8 kg (171 lb 8.3 oz)   BMI 27.68 kg/m²     25 y.o., at 30w4d by Estimated Date of Delivery: 20  Patient Active Problem List   Diagnosis    Pregnancy, supervision, normal, first/breast/pump/fmla/nexplanon    Vulval varices during pregnancy     OB History    Para Term  AB Living   1 0 0 0 0 0   SAB TAB Ectopic Multiple Live Births   0 0 0 0 0      # Outcome Date GA Lbr Harry/2nd Weight Sex Delivery Anes PTL Lv   1 Current                Dating reviewed    Allergies and problem list reviewed and updated    Medical and surgical history reviewed    Prenatal labs reviewed and updated    Physical Exam:  ABD: soft, gravid, nontender,     Assessment:  Franci was seen today for routine prenatal visit.    Diagnoses and all orders for this visit:    Encounter for supervision of normal first pregnancy in third trimester    Care and examination of lactating mother  -     BREAST PUMP FOR HOME USE         Plan:   Flu shot.  follow up ultrasound today   follow up 2 Weeks, bleeding/pain precautions  kick counts, labor precautions

## 2020-09-18 ENCOUNTER — PATIENT MESSAGE (OUTPATIENT)
Dept: OBSTETRICS AND GYNECOLOGY | Facility: CLINIC | Age: 25
End: 2020-09-18

## 2020-09-30 ENCOUNTER — ROUTINE PRENATAL (OUTPATIENT)
Dept: OBSTETRICS AND GYNECOLOGY | Facility: CLINIC | Age: 25
End: 2020-09-30
Payer: COMMERCIAL

## 2020-09-30 VITALS
BODY MASS INDEX: 27.93 KG/M2 | SYSTOLIC BLOOD PRESSURE: 102 MMHG | WEIGHT: 173.06 LBS | DIASTOLIC BLOOD PRESSURE: 70 MMHG

## 2020-09-30 DIAGNOSIS — Z34.03 ENCOUNTER FOR SUPERVISION OF NORMAL FIRST PREGNANCY IN THIRD TRIMESTER: Primary | ICD-10-CM

## 2020-09-30 DIAGNOSIS — O22.10: ICD-10-CM

## 2020-09-30 PROCEDURE — 0502F SUBSEQUENT PRENATAL CARE: CPT | Mod: CPTII,S$GLB,, | Performed by: OBSTETRICS & GYNECOLOGY

## 2020-09-30 PROCEDURE — 99999 PR PBB SHADOW E&M-EST. PATIENT-LVL III: ICD-10-PCS | Mod: PBBFAC,,, | Performed by: OBSTETRICS & GYNECOLOGY

## 2020-09-30 PROCEDURE — 99999 PR PBB SHADOW E&M-EST. PATIENT-LVL III: CPT | Mod: PBBFAC,,, | Performed by: OBSTETRICS & GYNECOLOGY

## 2020-09-30 PROCEDURE — 0502F PR SUBSEQUENT PRENATAL CARE: ICD-10-PCS | Mod: CPTII,S$GLB,, | Performed by: OBSTETRICS & GYNECOLOGY

## 2020-09-30 NOTE — PROGRESS NOTES
Good fm.  Denies ctx, vb, lof.  Franci was seen today for routine prenatal visit.    Diagnoses and all orders for this visit:    Encounter for supervision of normal first pregnancy in third trimester    Vulval varices during pregnancy

## 2020-10-07 ENCOUNTER — PATIENT MESSAGE (OUTPATIENT)
Dept: PEDIATRICS | Facility: CLINIC | Age: 25
End: 2020-10-07

## 2020-10-12 ENCOUNTER — PATIENT MESSAGE (OUTPATIENT)
Dept: OBSTETRICS AND GYNECOLOGY | Facility: CLINIC | Age: 25
End: 2020-10-12

## 2020-10-13 ENCOUNTER — PATIENT MESSAGE (OUTPATIENT)
Dept: OBSTETRICS AND GYNECOLOGY | Facility: CLINIC | Age: 25
End: 2020-10-13

## 2020-10-16 ENCOUNTER — ROUTINE PRENATAL (OUTPATIENT)
Dept: OBSTETRICS AND GYNECOLOGY | Facility: CLINIC | Age: 25
End: 2020-10-16
Payer: COMMERCIAL

## 2020-10-16 VITALS
WEIGHT: 175.06 LBS | DIASTOLIC BLOOD PRESSURE: 76 MMHG | SYSTOLIC BLOOD PRESSURE: 110 MMHG | BODY MASS INDEX: 28.25 KG/M2

## 2020-10-16 DIAGNOSIS — Z34.03 ENCOUNTER FOR SUPERVISION OF NORMAL FIRST PREGNANCY IN THIRD TRIMESTER: Primary | ICD-10-CM

## 2020-10-16 PROCEDURE — 0502F PR SUBSEQUENT PRENATAL CARE: ICD-10-PCS | Mod: CPTII,S$GLB,, | Performed by: ADVANCED PRACTICE MIDWIFE

## 2020-10-16 PROCEDURE — 0502F SUBSEQUENT PRENATAL CARE: CPT | Mod: CPTII,S$GLB,, | Performed by: ADVANCED PRACTICE MIDWIFE

## 2020-10-16 PROCEDURE — 99999 PR PBB SHADOW E&M-EST. PATIENT-LVL II: CPT | Mod: PBBFAC,,, | Performed by: ADVANCED PRACTICE MIDWIFE

## 2020-10-16 PROCEDURE — 99999 PR PBB SHADOW E&M-EST. PATIENT-LVL II: ICD-10-PCS | Mod: PBBFAC,,, | Performed by: ADVANCED PRACTICE MIDWIFE

## 2020-10-16 NOTE — PROGRESS NOTES
Reason for visit: Routine Prenatal Visit    HPI:   25 y.o., at 34w6d by Estimated Date of Delivery: 20    In with report of painful labial varicosities when works    ROS:  OBSTETRICS  - Contractions: denies  - Bleeding: denies  - Loss of fluid: denies  - Fetal movement: reports good FM, reinforced BID  GASTRO  - Nausea: none  - Vomiting: none  NEURO  - Headache: none      Past medical, surgical, social, family, and OB history: Reviewed and updated in EMR.  Medications: Reviewed and updated in EMR.  Allergies: Patient has no known allergies.   Pregnancy dating, labs, ultrasound reports, prenatal testing, and problem list: Reviewed and updated in EMR.    preg Problems (from 20 to present)     Problem Noted Resolved    Vulval varices during pregnancy 9/3/2020 by Margaret Watts MD No    Pregnancy, supervision, normal, first/breast/pump/fmla/nexplanon/flu/tdap 2020 by Margaret Watts MD No            Vitals: /76   Wt 79.4 kg (175 lb 0.7 oz)   BMI 28.25 kg/m²     Physical exam:  GENERAL: No acute distress, normal appearance  NECK: Supple, normal ROM  SKIN: No rashes  NEURO: Alert and oriented x3  PSYCH: Normal mood and affect  CARDIO: Trace bilateral LE edema  PULMONARY: Normal respiratory effort; no respiratory distress  ABD: Soft, gravid, nontender; no hernia or hepatosplenomegaly    Assessment and Plan:    Encounter for supervision of normal first pregnancy in third trimester      Comfort measures discussed for varicose veins- 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  Letter given requesting shorter shifts, less walking on route   labor precautions given  Follow-up: 2 weeks

## 2020-10-22 ENCOUNTER — PATIENT MESSAGE (OUTPATIENT)
Dept: OBSTETRICS AND GYNECOLOGY | Facility: CLINIC | Age: 25
End: 2020-10-22

## 2020-10-22 ENCOUNTER — HOSPITAL ENCOUNTER (EMERGENCY)
Facility: OTHER | Age: 25
Discharge: HOME | End: 2020-10-22
Attending: OBSTETRICS & GYNECOLOGY
Payer: COMMERCIAL

## 2020-10-22 VITALS
OXYGEN SATURATION: 100 % | SYSTOLIC BLOOD PRESSURE: 112 MMHG | RESPIRATION RATE: 18 BRPM | HEART RATE: 61 BPM | DIASTOLIC BLOOD PRESSURE: 73 MMHG | TEMPERATURE: 98 F
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
## 2020-10-22 DIAGNOSIS — Z3A.35 35 WEEKS GESTATION OF PREGNANCY: ICD-10-CM

## 2020-10-22 PROCEDURE — 59025 FETAL NON-STRESS TEST: CPT

## 2020-10-22 PROCEDURE — 59025 FETAL NON-STRESS TEST: CPT | Mod: 26,,, | Performed by: OBSTETRICS & GYNECOLOGY

## 2020-10-22 PROCEDURE — 99284 EMERGENCY DEPT VISIT MOD MDM: CPT | Mod: 25,,, | Performed by: OBSTETRICS & GYNECOLOGY

## 2020-10-22 PROCEDURE — 99284 PR EMERGENCY DEPT VISIT,LEVEL IV: ICD-10-PCS | Mod: 25,,, | Performed by: OBSTETRICS & GYNECOLOGY

## 2020-10-22 PROCEDURE — 99284 EMERGENCY DEPT VISIT MOD MDM: CPT | Mod: 25

## 2020-10-22 PROCEDURE — 25000003 PHARM REV CODE 250: Performed by: STUDENT IN AN ORGANIZED HEALTH CARE EDUCATION/TRAINING PROGRAM

## 2020-10-22 PROCEDURE — 59025 PR FETAL 2N-STRESS TEST: ICD-10-PCS | Mod: 26,,, | Performed by: OBSTETRICS & GYNECOLOGY

## 2020-10-22 RX ORDER — ACETAMINOPHEN 500 MG
1000 TABLET ORAL ONCE
Status: COMPLETED | OUTPATIENT
Start: 2020-10-22 | End: 2020-10-22

## 2020-10-22 RX ADMIN — ACETAMINOPHEN 1000 MG: 500 TABLET, FILM COATED ORAL at 02:10

## 2020-10-22 NOTE — ED PROVIDER NOTES
Encounter Date: 10/22/2020       History     Chief Complaint   Patient presents with    Fall     Franci Brown is a 25 y.o. [id number] at 35w5d presents complaining of a fall. 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 This fall occurred at 12:20 pm. States she hit her left elbow and lower back on the stairs. Denies hitting her abdomen or head. Denies loss of consciousness.  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  Patient denies contractions, denies vaginal bleeding, denies LOF.   Fetal Movement: normal.          Review of patient's allergies indicates:  No Known Allergies  No past medical history on file.  No past surgical history on file.  Family History   Problem Relation Age of Onset    Breast cancer Neg Hx     Colon cancer Neg Hx     Ovarian cancer Neg Hx      Social History     Tobacco Use    Smoking status: Former Smoker    Smokeless tobacco: Never Used   Substance Use Topics    Alcohol use: Not Currently     Comment: occasional    Drug use: Never     Comment: occasional     Review of Systems   Constitutional: Negative for chills, fatigue and fever.   HENT: Negative for congestion, rhinorrhea and sore throat.    Eyes: Negative for visual disturbance.   Respiratory: Negative for cough, chest tightness and shortness of breath.    Cardiovascular: Negative for chest pain and leg swelling.   Gastrointestinal: Negative for abdominal pain, nausea and vomiting.   Genitourinary: Negative for dysuria, pelvic pain, vaginal bleeding and vaginal discharge.   Musculoskeletal: Positive for back pain.        Left elbow pain   Neurological: Negative for dizziness and headaches.       Physical Exam     Initial Vitals   BP Pulse Resp Temp SpO2   10/22/20 1345 10/22/20 1400 10/22/20 1415 10/22/20 1415 10/22/20 1400   (!) 100/57 73 18 97.7 °F (36.5 °C) 100 %      MAP       --                Physical Exam    Constitutional: She appears well-developed  and well-nourished. No distress.   HENT:   Head: Normocephalic and atraumatic.   Eyes: Conjunctivae and EOM are normal.   Neck: Normal range of motion. No thyromegaly present.   Cardiovascular: Normal rate.   Pulmonary/Chest:   Normal work of breathing.   Abdominal: Soft. There is no abdominal tenderness. There is no rebound and no guarding.   Gravid uterus.   Musculoskeletal: Normal range of motion. No edema.   Neurological: She is alert and oriented to person, place, and time.   Skin: Skin is warm and dry.   Psychiatric: She has a normal mood and affect. Thought content normal.         ED Course   Obtain Fetal nonstress test (NST)    Date/Time: 10/22/2020 4:31 PM  Performed by: Coreen Garcia MD  Authorized by: Coreen Garcia MD     Nonstress Test:     Variability:  6-25 BPM    Decelerations:  None    Accelerations:  15 bpm    Baseline:  135    Uterine Irritability: Yes      Contractions:  Not present  Biophysical Profile:     Nonstress Test Interpretation: reactive      Overall Impression:  Reassuring      Labs Reviewed - No data to display       Imaging Results    None          Medical Decision Making:   Initial Assessment:   Franci Brown is a 25 y.o. [id number] at 35w5d presents complaining of a fall.  ED Management:  VSS, afebrile  No abdominal or head trauma during fall  Tylenol 1g and ice pack given for pain  4 hour (from time of fall) NST monitoring performed   NST reactive and reassuring  Few contractions noted on TOCO, patient denies feeling contractions  Patient stable for discharge home. Labor and pre eclampsia precautions given. Patient verbalized understanding, all questions answered. Plan discussed with hospitalist on call, who is in agreement with plan.                 Attending Attestation:   Physician Attestation Statement for Resident:  As the supervising MD   Physician Attestation Statement: I have personally seen and examined this patient.   I agree with the above history. -:   As the  supervising MD I agree with the above PE.    As the supervising MD I agree with the above treatment, course, plan, and disposition.   -:   NST  I independently reviewed the fetal non-stress test with the following interpretation:  135 BPM baseline  Variability: moderate  Accelerations: present  Decelerations: absent  Contractions: occasional  Category 1    Clinical Interpretation:reactive    Patient evaluated and found to be stable, agree with resident's assessment of reassuring fetal testing after fall onto her back with no s/s placental abruption or  labor and plan to discharge to home with precautions.  I was personally present during the critical portions of the procedure(s) performed by the resident and was immediately available in the ED to provide services and assistance as needed during the entire procedure.  I have reviewed the following: old records at this facility.                              Clinical Impression:     ICD-10-CM ICD-9-CM   1. Fall, initial encounter  W19.XXXA E888.9   2. 35 weeks gestation of pregnancy  Z3A.35 V22.2                          ED Disposition Condition    Discharge Stable        ED Prescriptions     None        Follow-up Information    None                   Coreen Garcia M.D.  OB/GYN PGY-1                     Coreen Garcia MD  Resident  10/22/20 0910       Jaycee Dobbins MD  10/22/20 170

## 2020-10-22 NOTE — DISCHARGE INSTRUCTIONS
"  Preventing Falls: Are You At Risk of Falling?     Ask for help to reduce risk of falling in your home.     As you get older, you're not as steady on your feet as you once were. And you may have health problems you didn't have when you were younger. So, it's not surprising that older people are more likely to trip and fall. Falling can be very serious. It can change your overall health and quality of life. That's why it's important to be aware of your own risk of falling.  The dangers of falling  Falls are one of the main causes of injury in people over age 65. An older person who falls may take longer to get better than a younger person. And, after a fall, an older person is more likely to have problems that don't go away. So, preventing falls can help you avoid serious health problems.  Are you at risk of falling?  Answer these questions to rate your level of risk.  · Are you a woman?  · Have you fallen or stumbled in the last year?  · Are you over age 65?  · Are you ever dizzy or lightheaded with standing?  · Do you have a hard time getting in and out of the bathtub or on and off the toilet?  · Do you lean on objects to help you get around? Or do you use a cane or walker?  · Do you have vision or hearing problems? For example, do you need new glasses or hearing aids?  · Do you have 2 or more long-lasting (chronic) medical conditions?  · Do you take 3 or more medicines?  · Have you felt depressed recently?  · Have you had more trouble with your memory in recent months?  · Are there hazards in your home that might cause you to fall, such as loose rugs or poor lighting?  · Do you have a pet that jumps on you or might trip you?  · Have you stopped getting regular exercise?  · Do you have diabetes?   · Do you have a neurologic disease, such as Parkinson or Alzheimer disease?   · Do you drink alcohol?  · Do you wear athletic shoes or slippers, or go barefoot at home?  You can help prevent falls  If you answered "yes" " to any of the above questions, you should take steps to reduce your risk of a fall. Monitoring health conditions and keeping walkways in your home free of clutter are just 2 ways. Changing is sometimes easier said than done. But keep in mind that even small changes can make you less likely to fall.  The fear of falling  It's normal to be scared of falling, especially if you've fallen before. But being afraid can actually make you more likely to fall. This is because:  · Fear might cause you to become less active. Being less active can lead to a loss of strength and balance.  · Fear can lead to isolation from others, depression, or the use of more medicines or alcohol. And all these things make falling even more likely.  To break the cycle, learn more about ways to avoid falling. As you take control, you may find yourself feeling less afraid.   Date Last Reviewed: 6/12/2015  © 3412-9034 [employer]. [address] [place] [address], [place] [address]. All rights reserved. This information is not intended as a substitute for professional medical care. Always follow your healthcare professional's instructions.

## 2021-04-26 ENCOUNTER — PATIENT MESSAGE (OUTPATIENT)
Dept: RESEARCH | Facility: HOSPITAL | Age: 26
End: 2021-04-26